# Patient Record
Sex: MALE | Race: WHITE | URBAN - METROPOLITAN AREA
[De-identification: names, ages, dates, MRNs, and addresses within clinical notes are randomized per-mention and may not be internally consistent; named-entity substitution may affect disease eponyms.]

---

## 2022-04-22 ENCOUNTER — OFFICE VISIT (OUTPATIENT)
Dept: URBAN - METROPOLITAN AREA CLINIC 52 | Facility: CLINIC | Age: 42
End: 2022-04-22
Payer: COMMERCIAL

## 2022-04-22 DIAGNOSIS — H40.9 UNSPECIFIED GLAUCOMA: ICD-10-CM

## 2022-04-22 DIAGNOSIS — H20.011 PRIMARY IRIDOCYCLITIS, RIGHT EYE: Primary | ICD-10-CM

## 2022-04-22 DIAGNOSIS — H44.441 PRIMARY HYPOTONY OF RIGHT EYE: ICD-10-CM

## 2022-04-22 DIAGNOSIS — H27.01 APHAKIA, RIGHT EYE: ICD-10-CM

## 2022-04-22 PROCEDURE — 99204 OFFICE O/P NEW MOD 45 MIN: CPT | Performed by: OPTOMETRIST

## 2022-04-22 RX ORDER — PREDNISOLONE ACETATE 10 MG/ML
1 % SUSPENSION/ DROPS OPHTHALMIC
Qty: 5 | Refills: 3 | Status: ACTIVE
Start: 2022-04-22

## 2022-04-22 ASSESSMENT — VISUAL ACUITY: OS: 20/20

## 2022-04-22 ASSESSMENT — INTRAOCULAR PRESSURE
OS: 12
OD: 4

## 2022-04-22 NOTE — IMPRESSION/PLAN
Impression: Unspecified glaucoma: H40.9. Plan: Patient unsure of name of drop, states he uses QHS dosing. Advised patient to stop taking gtt OD until he sees retina. Patient interested in transferring glaucoma care to [facility], discussed that I would be happy to take over his case, advised him to setup glaucoma eval if he decides to proceed with us.

## 2022-04-22 NOTE — IMPRESSION/PLAN
Impression: Primary iridocyclitis, right eye: H20.011. Plan: Patient presents with [facility] reaction, corneal edema, and hypotony of OD with ciliary flush. Will start Pred Acetate Q3hrs OD. IOP measures zero today with goldmann tonometry, no posterior view due to corneal haze and AC reaction, possible retinal/choroidal detachment. Patient states he now has much less vision than before. Will setup patient with retina for eval with B scan, possible vitrectomy to increase IOP, and continued management of iridocyclitis.

## 2022-04-25 ENCOUNTER — OFFICE VISIT (OUTPATIENT)
Dept: URBAN - METROPOLITAN AREA CLINIC 13 | Facility: CLINIC | Age: 42
End: 2022-04-25
Payer: COMMERCIAL

## 2022-04-25 DIAGNOSIS — H17.9 CORNEAL SCAR: ICD-10-CM

## 2022-04-25 DIAGNOSIS — H33.001 UNSPECIFIED RETINAL DETACHMENT WITH RETINAL BREAK, RIGHT EYE: Primary | ICD-10-CM

## 2022-04-25 DIAGNOSIS — H27.01 APHAKIA, RIGHT EYE: ICD-10-CM

## 2022-04-25 DIAGNOSIS — H25.12 AGE-RELATED NUCLEAR CATARACT, LEFT EYE: ICD-10-CM

## 2022-04-25 DIAGNOSIS — H40.9 GLAUCOMA: ICD-10-CM

## 2022-04-25 PROCEDURE — 92134 CPTRZ OPH DX IMG PST SGM RTA: CPT | Performed by: OPHTHALMOLOGY

## 2022-04-25 PROCEDURE — 92004 COMPRE OPH EXAM NEW PT 1/>: CPT | Performed by: OPHTHALMOLOGY

## 2022-04-25 PROCEDURE — 76512 OPH US DX B-SCAN: CPT | Performed by: OPHTHALMOLOGY

## 2022-04-25 RX ORDER — PREDNISOLONE ACETATE 10 MG/ML
1 % SUSPENSION/ DROPS OPHTHALMIC
Qty: 5 | Refills: 3 | Status: ACTIVE
Start: 2022-04-25

## 2022-04-25 RX ORDER — OFLOXACIN 3 MG/ML
0.3 % SOLUTION/ DROPS OPHTHALMIC
Qty: 5 | Refills: 3 | Status: INACTIVE
Start: 2022-04-25 | End: 2022-05-16

## 2022-04-25 ASSESSMENT — INTRAOCULAR PRESSURE
OS: 19
OD: 14

## 2022-04-25 NOTE — IMPRESSION/PLAN
Impression: Unspecified retinal detachment with retinal break, right eye: H33.001.
-symptoms x 1-2 weeks OCT:
OD: no view OS: wnl BScan OD: 
total RD, +PVD Plan: Examination reveals a retinal detachment. The diagnosis, natural history, and prognosis of rhegmatogenous retinal detachment, as well as the risks and benefits of intervention were discussed at length. . To reduce the risk of further visual loss, treatment is strongly recommended. The patient was informed of various surgical techniques; altitude precautions with a gas bubble, including the danger of loss of the eye with travel to a higher altitude or flying; and the possible need to update spectacle correction if a scleral buckle is used. The patient understands that the vision usually improves gradually over a period of several months to 1 year, but may not improve to prior levels. The patient elects to proceed with retinal detachment repair. Risk of redetachment, or proliferative vitreoretinopathy, scar formation, macular pucker, hyphema, glaucoma, hypotony, infection, loss of vision, loss of eye, blindness were fully explained to the patient who voices understanding and agreed to surgery. 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 Discussed limited visual prognosis. Discussed possibility of needing multiple surgeries also corneal transplant. Risk of PVR and failure of surgical repair discussed.  

Rec: 25 g PPV, EL, SO OD x RRD (within 24 hours)

## 2022-04-26 ENCOUNTER — Encounter (OUTPATIENT)
Dept: URBAN - METROPOLITAN AREA EXTERNAL CLINIC 14 | Facility: EXTERNAL CLINIC | Age: 42
End: 2022-04-26
Payer: COMMERCIAL

## 2022-04-26 PROCEDURE — 67108 REPAIR DETACHED RETINA: CPT | Performed by: OPHTHALMOLOGY

## 2022-04-27 ENCOUNTER — POST-OPERATIVE VISIT (OUTPATIENT)
Dept: URBAN - METROPOLITAN AREA CLINIC 54 | Facility: CLINIC | Age: 42
End: 2022-04-27
Payer: COMMERCIAL

## 2022-04-27 PROCEDURE — 99024 POSTOP FOLLOW-UP VISIT: CPT | Performed by: OPHTHALMOLOGY

## 2022-04-27 RX ORDER — BRIMONIDINE TARTRATE 1.5 MG/ML
0.15 % SOLUTION/ DROPS OPHTHALMIC
Qty: 5 | Refills: 3 | Status: INACTIVE
Start: 2022-04-27 | End: 2022-04-27

## 2022-04-27 ASSESSMENT — INTRAOCULAR PRESSURE
OS: 10
OD: 28

## 2022-04-27 NOTE — IMPRESSION/PLAN
Impression: S/P 25 g PPV, EL, SO OD x RRD OD - 1 Day. Unspecified retinal detachment with retinal break, right eye  H33.001. Plan: avoid supine 
alt. prec. FDP
add alphagan RTC 1 week POS with NVP --Continue Prednisolone acetate 1%--Continue Ofloxacin 0.3% QID OD

## 2022-05-03 ENCOUNTER — POST-OPERATIVE VISIT (OUTPATIENT)
Dept: URBAN - METROPOLITAN AREA CLINIC 13 | Facility: CLINIC | Age: 42
End: 2022-05-03

## 2022-05-03 PROCEDURE — 99024 POSTOP FOLLOW-UP VISIT: CPT | Performed by: OPHTHALMOLOGY

## 2022-05-03 RX ORDER — DORZOLAMIDE HYDROCHLORIDE AND TIMOLOL MALEATE 20; 5 MG/ML; MG/ML
SOLUTION/ DROPS OPHTHALMIC
Qty: 0 | Refills: 0 | Status: INACTIVE
Start: 2022-05-03 | End: 2022-05-03

## 2022-05-03 RX ORDER — ACETAZOLAMIDE 250 MG/1
250 MG TABLET ORAL
Qty: 32 | Refills: 0 | Status: INACTIVE
Start: 2022-05-03 | End: 2022-05-10

## 2022-05-03 ASSESSMENT — INTRAOCULAR PRESSURE
OS: 15
OD: 43
OD: 38
OD: 15

## 2022-05-03 NOTE — IMPRESSION/PLAN
Impression: S/P 25 g PPV, EL, SO OD x RRD OD - 7 Days. Unspecified retinal detachment with retinal break, right eye  H33.001. Plan: Pt with high IOP. Has patent PI. AC deep. Has some gas in cilliary sulcus. Aphakic. Will do AC tap today. IOP post tap 15mm Hg. Position on side. Start Diamox 250mg po QID Continue Oflox QID x 3 days, Pred QID, Brimonidine TID, Latanoprost qhs
RTC 1 week DFE OD

## 2022-05-10 ENCOUNTER — POST-OPERATIVE VISIT (OUTPATIENT)
Dept: URBAN - METROPOLITAN AREA CLINIC 13 | Facility: CLINIC | Age: 42
End: 2022-05-10
Payer: COMMERCIAL

## 2022-05-10 PROCEDURE — 99024 POSTOP FOLLOW-UP VISIT: CPT | Performed by: OPHTHALMOLOGY

## 2022-05-10 ASSESSMENT — INTRAOCULAR PRESSURE
OD: 17
OS: 12

## 2022-05-10 NOTE — IMPRESSION/PLAN
Impression: S/P 25 g PPV, EL, SO OD x RRD OD - 14 Days. Unspecified retinal detachment with retinal break, right eye  H33.001. Plan: IOP better. Pain better. Still with significant MCE. Will start Augustina drops. Continue PF BID, Brimonidine BID, Latanoprost qhs, Diamox 250mg BID until runs out. Sleep on side. Gas precautions RTC 2 weeks DFE OD OCT OD

## 2022-05-16 ENCOUNTER — POST-OPERATIVE VISIT (OUTPATIENT)
Dept: URBAN - METROPOLITAN AREA CLINIC 54 | Facility: CLINIC | Age: 42
End: 2022-05-16
Payer: COMMERCIAL

## 2022-05-16 PROCEDURE — 99024 POSTOP FOLLOW-UP VISIT: CPT | Performed by: OPHTHALMOLOGY

## 2022-05-16 ASSESSMENT — INTRAOCULAR PRESSURE
OS: 15
OD: 28

## 2022-05-16 NOTE — IMPRESSION/PLAN
Impression: S/P 25 g PPV, EL, SO OD x RRD OD - 20 Days. Unspecified retinal detachment with retinal break, right eye  H33.001. Plan: Patient returns reporting seeing a line across vision in right eye. Discussed with patient this likely represents the gas bubble which would be down to about 50% at this time in post-operative period and would bisect vision. Patient also states that vision was better prior to surgery. Reviewed effects of gas bubble on vision. IOP remains elevated today. Patient unsure if taking drops/meds appropriately. Written instructions provided to patient to ensure patient using medications as directed:

Prednisolone BID OD Brimonidine BID OD Latanoprost q hs OD Diamox 250 mg BID (#14 tablets provided to patient) Augustina drops QID OD Follow up with Dr Judith Jane as scheduled in 2 weeks

## 2022-05-24 ENCOUNTER — POST-OPERATIVE VISIT (OUTPATIENT)
Dept: URBAN - METROPOLITAN AREA CLINIC 13 | Facility: CLINIC | Age: 42
End: 2022-05-24

## 2022-05-24 PROCEDURE — 99024 POSTOP FOLLOW-UP VISIT: CPT | Performed by: OPHTHALMOLOGY

## 2022-05-24 PROCEDURE — 76512 OPH US DX B-SCAN: CPT | Performed by: OPHTHALMOLOGY

## 2022-05-24 ASSESSMENT — INTRAOCULAR PRESSURE
OS: 10
OD: 6

## 2022-05-24 NOTE — IMPRESSION/PLAN
Impression: S/P 25 g PPV, EL, SO OD x RRD OD - 28 Days. Unspecified retinal detachment with retinal break, right eye  H33.001. B-scan OD: shallow SRF below level of gas Plan: Continue current drops: PF BID, Brimonidine BID, Latanoprost qhs, Augustina QID. Will refer to [facility] for evaluation for combo sx. Possible inferior RD on B-scan. Will recheck in 2 weeks.  

Rec: 25g PPV, EL, Gas vs SO OD x RD (combo TKP, PKP)

## 2022-06-06 ENCOUNTER — POST-OPERATIVE VISIT (OUTPATIENT)
Dept: URBAN - METROPOLITAN AREA CLINIC 13 | Facility: CLINIC | Age: 42
End: 2022-06-06
Payer: COMMERCIAL

## 2022-06-06 DIAGNOSIS — H33.001 UNSPECIFIED RETINAL DETACHMENT WITH RETINAL BREAK, RIGHT EYE: Primary | ICD-10-CM

## 2022-06-06 PROCEDURE — 99024 POSTOP FOLLOW-UP VISIT: CPT | Performed by: OPHTHALMOLOGY

## 2022-06-06 ASSESSMENT — INTRAOCULAR PRESSURE
OS: 12
OD: 20

## 2022-06-06 NOTE — IMPRESSION/PLAN
Impression: S/P 25 g PPV, EL, SO OD x RRD OD - 41 Days. Unspecified retinal detachment with retinal break, right eye  H33.001. B-scan OD: inferior RD Plan: Unfortunately pt has recurrent RD. Will schedule combo sx. Rec: 25g PPV, TKP, PKP, EL, SO OD x RD.  Combo with Dr Ernestina Mead

## 2022-06-22 ENCOUNTER — POST-OPERATIVE VISIT (OUTPATIENT)
Dept: URBAN - METROPOLITAN AREA CLINIC 13 | Facility: CLINIC | Age: 42
End: 2022-06-22
Payer: COMMERCIAL

## 2022-06-22 DIAGNOSIS — H33.001 UNSPECIFIED RETINAL DETACHMENT WITH RETINAL BREAK, RIGHT EYE: Primary | ICD-10-CM

## 2022-06-22 PROCEDURE — 99024 POSTOP FOLLOW-UP VISIT: CPT | Performed by: OPHTHALMOLOGY

## 2022-06-22 ASSESSMENT — INTRAOCULAR PRESSURE
OS: 12
OD: 22

## 2022-06-22 NOTE — IMPRESSION/PLAN
Impression: S/P 25G PPV/TKP/PKP/EL/SO OD - 1 Day. Unspecified retinal detachment with retinal break, right eye  H33.001.  Plan: --Excellent post op course   
--Post operative instructions reviewed 
--Condition is improving 
--No signs or symptoms of infection
--IOP normal

RTC: 1 week POS with NVP

## 2022-06-23 ENCOUNTER — POST-OPERATIVE VISIT (OUTPATIENT)
Dept: URBAN - METROPOLITAN AREA CLINIC 54 | Facility: CLINIC | Age: 42
End: 2022-06-23
Payer: COMMERCIAL

## 2022-06-23 PROCEDURE — 99024 POSTOP FOLLOW-UP VISIT: CPT | Performed by: OPHTHALMOLOGY

## 2022-06-23 ASSESSMENT — INTRAOCULAR PRESSURE
OD: 22
OS: 13

## 2022-06-23 NOTE — IMPRESSION/PLAN
Impression: S/P 25G PPV/TKP/PKP/EL/SO OD - 2 Days. Unspecified retinal detachment with retinal break, right eye  H33.001. Plan: Continue Oflox QID; Brand name Pred Forte 6x/day; Brimonidine BID. Doing well. Reassurance given to patient. Sleep on side Pt with medial gaze restriction. ? secondary to block. Pt states it has been this way since his injury as child. RIght eye has always turned out.  

RTC as scheduled

## 2022-06-28 ENCOUNTER — POST-OPERATIVE VISIT (OUTPATIENT)
Dept: URBAN - METROPOLITAN AREA CLINIC 13 | Facility: CLINIC | Age: 42
End: 2022-06-28
Payer: COMMERCIAL

## 2022-06-28 DIAGNOSIS — H33.001 UNSPECIFIED RETINAL DETACHMENT WITH RETINAL BREAK, RIGHT EYE: Primary | ICD-10-CM

## 2022-06-28 PROCEDURE — 99024 POSTOP FOLLOW-UP VISIT: CPT | Performed by: OPHTHALMOLOGY

## 2022-06-28 ASSESSMENT — INTRAOCULAR PRESSURE
OD: 31
OS: 14

## 2022-06-28 NOTE — IMPRESSION/PLAN
Impression: S/P 25G PPV/TKP/PKP/EL/SO OD - 7 Days. Unspecified retinal detachment with retinal break, right eye  H33.001. Plan: PF per Dr Rubia Oakley. Stop Oflox. Continue Brimonidine and Latanoprost. IOP elevated today (possible steroid response) but difficult measurement. Pt is seeing Dr Rubia Oakley tomorrow for evaluation. Will have her office re-check IOP if still high consider additional med. Medial gaze restriction- stable. Per pt has been this way for longtime. Will refer to strabismus after healed.  

RTC 1 month POS DFE/OCT OD

## 2022-07-26 ENCOUNTER — POST-OPERATIVE VISIT (OUTPATIENT)
Dept: URBAN - METROPOLITAN AREA CLINIC 13 | Facility: CLINIC | Age: 42
End: 2022-07-26

## 2022-07-26 DIAGNOSIS — H33.001 UNSPECIFIED RETINAL DETACHMENT WITH RETINAL BREAK, RIGHT EYE: Primary | ICD-10-CM

## 2022-07-26 PROCEDURE — 99024 POSTOP FOLLOW-UP VISIT: CPT | Performed by: OPHTHALMOLOGY

## 2022-07-26 RX ORDER — ACETAZOLAMIDE 250 MG/1
250 MG TABLET ORAL
Qty: 60 | Refills: 1 | Status: INACTIVE
Start: 2022-07-26 | End: 2022-08-24

## 2022-07-26 ASSESSMENT — INTRAOCULAR PRESSURE
OD: 27
OS: 15

## 2022-07-26 NOTE — IMPRESSION/PLAN
Impression: S/P 25G PPV/TKP/PKP/EL/SO OD - 35 Days. Unspecified retinal detachment with retinal break, right eye  H33.001. OCT: 
OD: central atrophy; no ERM
OS:  wnl Plan: Continue PF TID. Continue Brimonidine BID, Latanoprost qhs. Pt states he is on 3rd IOP drop but not sure of name. Sleep on side. Will add Diamox 250mg BID. Given persistently high IOP will schedule early SO removal if ok with [facility]. 

Rec: 25g PPV Removal SO OD

## 2022-08-03 ENCOUNTER — POST-OPERATIVE VISIT (OUTPATIENT)
Dept: URBAN - METROPOLITAN AREA CLINIC 13 | Facility: CLINIC | Age: 42
End: 2022-08-03

## 2022-08-03 DIAGNOSIS — H33.001 UNSPECIFIED RETINAL DETACHMENT WITH RETINAL BREAK, RIGHT EYE: Primary | ICD-10-CM

## 2022-08-03 PROCEDURE — 99024 POSTOP FOLLOW-UP VISIT: CPT | Performed by: OPHTHALMOLOGY

## 2022-08-03 ASSESSMENT — INTRAOCULAR PRESSURE
OD: 7
OS: 12

## 2022-08-03 NOTE — IMPRESSION/PLAN
Impression: S/P 25g PPV Removal SO OD OD - 1 Day. Unspecified retinal detachment with retinal break, right eye  H33.001. Plan: Retina remains attached. IOP satisfactory OU. No infection. Patient is doing well. Starting Prednisolone and Ofloxacin QID OD. Continue Brimonidine BID OD, Latanoprost q hs OD Return in 1 week pos

## 2022-08-09 ENCOUNTER — POST-OPERATIVE VISIT (OUTPATIENT)
Dept: URBAN - METROPOLITAN AREA CLINIC 13 | Facility: CLINIC | Age: 42
End: 2022-08-09
Payer: COMMERCIAL

## 2022-08-09 DIAGNOSIS — H33.001 UNSPECIFIED RETINAL DETACHMENT WITH RETINAL BREAK, RIGHT EYE: Primary | ICD-10-CM

## 2022-08-09 PROCEDURE — 92134 CPTRZ OPH DX IMG PST SGM RTA: CPT | Performed by: OPHTHALMOLOGY

## 2022-08-09 PROCEDURE — 76512 OPH US DX B-SCAN: CPT | Performed by: OPHTHALMOLOGY

## 2022-08-09 PROCEDURE — 99024 POSTOP FOLLOW-UP VISIT: CPT | Performed by: OPHTHALMOLOGY

## 2022-08-09 ASSESSMENT — INTRAOCULAR PRESSURE
OD: 21
OS: 13

## 2022-08-09 NOTE — IMPRESSION/PLAN
Impression: S/P 25g PPV Removal SO OD OD - 7 Days. Unspecified retinal detachment with retinal break, right eye  H33.001. B-scan: retina attached Plan: Doing well. Retina attached. IOP better. PF QID Brimonidine BID Latanoprost qhs

RTC 1 month DFE OD OCT OD

## 2022-09-08 ENCOUNTER — POST-OPERATIVE VISIT (OUTPATIENT)
Dept: URBAN - METROPOLITAN AREA CLINIC 54 | Facility: CLINIC | Age: 42
End: 2022-09-08
Payer: COMMERCIAL

## 2022-09-08 DIAGNOSIS — H33.001 UNSPECIFIED RETINAL DETACHMENT WITH RETINAL BREAK, RIGHT EYE: Primary | ICD-10-CM

## 2022-09-08 PROCEDURE — 99024 POSTOP FOLLOW-UP VISIT: CPT | Performed by: OPHTHALMOLOGY

## 2022-09-08 ASSESSMENT — INTRAOCULAR PRESSURE
OD: 9
OS: 13

## 2022-09-08 NOTE — IMPRESSION/PLAN
Impression: S/P 25g PPV Removal SO OD OD - 37 Days. Unspecified retinal detachment with retinal break, right eye  H33.001. OCT:
OD: mac flat; central atrophy Plan: Doing well. VH cleared. Retina attached. IOP better.  

Continue PF QID (will let Dr Rubia Oakley taper when ready), Brimonidine BID, Latanoprost qhs

RTC 3 months DFE OU OCT OU

## 2022-11-15 ENCOUNTER — OFFICE VISIT (OUTPATIENT)
Dept: URBAN - METROPOLITAN AREA CLINIC 45 | Facility: CLINIC | Age: 42
End: 2022-11-15
Payer: COMMERCIAL

## 2022-11-15 DIAGNOSIS — H40.1113 PRIMARY OPEN-ANGLE GLAUCOMA, RIGHT EYE, SEVERE STAGE: Primary | ICD-10-CM

## 2022-11-15 DIAGNOSIS — H40.1121 PRIMARY OPEN-ANGLE GLAUCOMA, LEFT EYE, MILD STAGE: ICD-10-CM

## 2022-11-15 PROCEDURE — 92083 EXTENDED VISUAL FIELD XM: CPT | Performed by: OPHTHALMOLOGY

## 2022-11-15 PROCEDURE — 99204 OFFICE O/P NEW MOD 45 MIN: CPT | Performed by: OPHTHALMOLOGY

## 2022-11-15 PROCEDURE — 92133 CPTRZD OPH DX IMG PST SGM ON: CPT | Performed by: OPHTHALMOLOGY

## 2022-11-15 PROCEDURE — 76514 ECHO EXAM OF EYE THICKNESS: CPT | Performed by: OPHTHALMOLOGY

## 2022-11-15 ASSESSMENT — INTRAOCULAR PRESSURE
OD: 9
OS: 10

## 2022-11-16 NOTE — IMPRESSION/PLAN
Impression: Primary open-angle glaucoma, right eye, severe stage: H40.1113. /586, 11/22 OCT 64/77 4/10, 11/22 HVF OD-unable OS ID, GCC -/68

s/p PKP OD Plan: Discussed diagnosis with patient. HVF and RNFL OCT ordered and reviewed with patient. Discussed treatment options. Recommend patient continue Latanoprost QHS OU, Brimonidine BID OD and Prednisolone BID OD. Will continue to monitor.  4 month IOP check and Optos

## 2022-12-08 ENCOUNTER — OFFICE VISIT (OUTPATIENT)
Dept: URBAN - METROPOLITAN AREA CLINIC 54 | Facility: CLINIC | Age: 42
End: 2022-12-08
Payer: COMMERCIAL

## 2022-12-08 DIAGNOSIS — H17.9 CORNEAL SCAR: ICD-10-CM

## 2022-12-08 DIAGNOSIS — H33.001 UNSPECIFIED RETINAL DETACHMENT WITH RETINAL BREAK, RIGHT EYE: ICD-10-CM

## 2022-12-08 DIAGNOSIS — H40.1121 PRIMARY OPEN-ANGLE GLAUCOMA, LEFT EYE, MILD STAGE: Primary | ICD-10-CM

## 2022-12-08 DIAGNOSIS — H59.811 CHORIORETINAL SCAR AFTER SURGERY FOR DETACHMENT OF RIGHT EYE: ICD-10-CM

## 2022-12-08 DIAGNOSIS — H40.9 GLAUCOMA: ICD-10-CM

## 2022-12-08 PROCEDURE — 92134 CPTRZ OPH DX IMG PST SGM RTA: CPT | Performed by: OPHTHALMOLOGY

## 2022-12-08 PROCEDURE — 99213 OFFICE O/P EST LOW 20 MIN: CPT | Performed by: OPHTHALMOLOGY

## 2022-12-08 ASSESSMENT — INTRAOCULAR PRESSURE
OS: 16
OD: 21

## 2022-12-08 NOTE — IMPRESSION/PLAN
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 Right.
-s/p RD repair OCT: 
OD: flat OS: wnl Plan: Doing well. VH cleared. Retina attached. IOP better.  

Continue PF q day (will let Dr Sarah Gomez taper when ready), Brimonidine BID, Latanoprost qhs

RTC 6 months DFE OU OCT OU

## 2023-04-25 ENCOUNTER — OFFICE VISIT (OUTPATIENT)
Dept: URBAN - METROPOLITAN AREA CLINIC 13 | Facility: CLINIC | Age: 43
End: 2023-04-25
Payer: COMMERCIAL

## 2023-04-25 DIAGNOSIS — H59.811 CHORIORETINAL SCAR AFTER SURGERY FOR DETACHMENT OF RIGHT EYE: Primary | ICD-10-CM

## 2023-04-25 DIAGNOSIS — H17.9 UNSPECIFIED CORNEAL SCAR AND OPACITY: ICD-10-CM

## 2023-04-25 DIAGNOSIS — H40.1121 PRIMARY OPEN-ANGLE GLAUCOMA, LEFT EYE, MILD STAGE: ICD-10-CM

## 2023-04-25 DIAGNOSIS — H40.1113 PRIMARY OPEN-ANGLE GLAUCOMA, RIGHT EYE, SEVERE STAGE: ICD-10-CM

## 2023-04-25 PROCEDURE — 92134 CPTRZ OPH DX IMG PST SGM RTA: CPT | Performed by: OPHTHALMOLOGY

## 2023-04-25 PROCEDURE — 99213 OFFICE O/P EST LOW 20 MIN: CPT | Performed by: OPHTHALMOLOGY

## 2023-04-25 ASSESSMENT — INTRAOCULAR PRESSURE
OS: 13
OD: 20

## 2023-04-25 NOTE — IMPRESSION/PLAN
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 Right.
-s/p RD repair OCT: 
OD: flat OS: wnl Plan: Doing well. VH cleared. Retina attached. IOP better.  

RTC 6 months DFE OU OCT OU

## 2023-04-25 NOTE — IMPRESSION/PLAN
Impression: Primary open-angle glaucoma, right eye, severe stage: H40.1113.  /586, 11/22 OCT 64/77 4/10, 11/22 HVF OD-unable OS ID, GCC -/68

s/p PKP OD Plan: Continue follow up with Dr Jessica Simpson

## 2023-05-10 ENCOUNTER — OFFICE VISIT (OUTPATIENT)
Dept: URBAN - METROPOLITAN AREA CLINIC 45 | Facility: CLINIC | Age: 43
End: 2023-05-10
Payer: COMMERCIAL

## 2023-05-10 DIAGNOSIS — H17.9 CORNEAL SCAR: ICD-10-CM

## 2023-05-10 DIAGNOSIS — H40.1121 PRIMARY OPEN-ANGLE GLAUCOMA, LEFT EYE, MILD STAGE: ICD-10-CM

## 2023-05-10 PROCEDURE — 99213 OFFICE O/P EST LOW 20 MIN: CPT | Performed by: OPHTHALMOLOGY

## 2023-05-10 ASSESSMENT — INTRAOCULAR PRESSURE
OD: 9
OS: 12

## 2023-05-10 NOTE — IMPRESSION/PLAN
Impression: Primary open-angle glaucoma, right eye, severe stage: H40.1113. /586, 11/22 OCT 64/77 4/10, 11/22 HVF OD-unable OS ID, GCC -/68

s/p PKP OD Plan: IOP is low. Continue Timolol BID OD, Latanoprost QHS OU, Brimonidine BID OD and Prednisolone QDAY OD.  2 month IOP check.

## 2023-05-11 ENCOUNTER — OFFICE VISIT (OUTPATIENT)
Dept: URBAN - METROPOLITAN AREA CLINIC 54 | Facility: CLINIC | Age: 43
End: 2023-05-11
Payer: COMMERCIAL

## 2023-05-11 DIAGNOSIS — H40.1113 PRIMARY OPEN-ANGLE GLAUCOMA, RIGHT EYE, SEVERE STAGE: ICD-10-CM

## 2023-05-11 DIAGNOSIS — H27.01 APHAKIA, RIGHT EYE: ICD-10-CM

## 2023-05-11 DIAGNOSIS — H59.811 CHORIORETINAL SCAR AFTER SURGERY FOR DETACHMENT OF RIGHT EYE: Primary | ICD-10-CM

## 2023-05-11 PROCEDURE — 99213 OFFICE O/P EST LOW 20 MIN: CPT | Performed by: OPHTHALMOLOGY

## 2023-05-11 PROCEDURE — 92134 CPTRZ OPH DX IMG PST SGM RTA: CPT | Performed by: OPHTHALMOLOGY

## 2023-05-11 ASSESSMENT — INTRAOCULAR PRESSURE
OD: 10
OS: 12

## 2023-05-11 NOTE — IMPRESSION/PLAN
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 Right.
-s/p RD repair OCT: 05/11/23 OD: flat OS: wnl Plan: Doing well. VH cleared. Retina attached. IOP better.  

RTC 6 months DFE OU OCT OU

## 2023-05-11 NOTE — IMPRESSION/PLAN
Impression: Aphakia, right eye: H27.01 Right. Plan: Long discussion with patient about secondary IOL vs aphakic correction. He prefers to try contact lens first before deciding on IOL. RTC as scheduled.  Will refer for contact lens exam.

## 2023-05-11 NOTE — IMPRESSION/PLAN
Impression: Primary open-angle glaucoma, right eye, severe stage: H40.1113.  /586, 11/22 OCT 64/77 4/10, 11/22 HVF OD-unable OS ID, GCC -/68

s/p PKP OD Plan: Continue follow up with Dr Angela Quintana

## 2023-05-11 NOTE — IMPRESSION/PLAN
Apply Erythromycin ointment to prevent infection and take Percocet as needed for pain.   Follow up with Ophthalmology in 1-2 days. You have a large corneal abrasion which can turn into a bad infection and lead to blindness if you do not follow up.   Return to ER for worsening symptoms or as needed.    Impression: Primary open-angle glaucoma, left eye, mild stage: H40.1121. Plan: Review H40.1113.

## 2023-07-19 ENCOUNTER — OFFICE VISIT (OUTPATIENT)
Dept: URBAN - METROPOLITAN AREA CLINIC 45 | Facility: CLINIC | Age: 43
End: 2023-07-19
Payer: COMMERCIAL

## 2023-07-19 DIAGNOSIS — H40.1121 PRIMARY OPEN-ANGLE GLAUCOMA, LEFT EYE, MILD STAGE: ICD-10-CM

## 2023-07-19 DIAGNOSIS — H17.9 CORNEAL SCAR: ICD-10-CM

## 2023-07-19 DIAGNOSIS — H40.1113 PRIMARY OPEN-ANGLE GLAUCOMA, RIGHT EYE, SEVERE STAGE: Primary | ICD-10-CM

## 2023-07-19 PROCEDURE — 92012 INTRM OPH EXAM EST PATIENT: CPT | Performed by: OPHTHALMOLOGY

## 2023-07-19 ASSESSMENT — INTRAOCULAR PRESSURE
OS: 10
OD: 10

## 2023-07-19 NOTE — IMPRESSION/PLAN
Impression: Primary open-angle glaucoma, right eye, severe stage: H40.1113. /586, 11/22 OCT 64/77 4/10, 11/22 HVF OD-unable OS ID, GCC -/68

s/p PKP OD Plan: IOP remains stable. Continue Timolol BID OD, Latanoprost QHS OU, Brimonidine BID OD and Prednisolone QDAY OD. 4 month IOP check.

## 2023-11-30 ENCOUNTER — OFFICE VISIT (OUTPATIENT)
Dept: URBAN - METROPOLITAN AREA CLINIC 54 | Facility: CLINIC | Age: 43
End: 2023-11-30
Payer: COMMERCIAL

## 2023-11-30 DIAGNOSIS — H27.01 APHAKIA, RIGHT EYE: ICD-10-CM

## 2023-11-30 DIAGNOSIS — H40.1113 PRIMARY OPEN-ANGLE GLAUCOMA, RIGHT EYE, SEVERE STAGE: ICD-10-CM

## 2023-11-30 DIAGNOSIS — Z94.7 CORNEAL TRANSPLANT STATUS: ICD-10-CM

## 2023-11-30 PROCEDURE — 99213 OFFICE O/P EST LOW 20 MIN: CPT | Performed by: OPHTHALMOLOGY

## 2023-11-30 PROCEDURE — 92134 CPTRZ OPH DX IMG PST SGM RTA: CPT | Performed by: OPHTHALMOLOGY

## 2023-11-30 RX ORDER — BRIMONIDINE TARTRATE 2 MG/ML
0.2 % SOLUTION/ DROPS OPHTHALMIC
Qty: 5 | Refills: 2 | Status: INACTIVE
Start: 2023-11-30 | End: 2024-01-09

## 2023-11-30 ASSESSMENT — INTRAOCULAR PRESSURE
OS: 8
OD: 11

## 2024-01-09 ENCOUNTER — OFFICE VISIT (OUTPATIENT)
Dept: URBAN - METROPOLITAN AREA CLINIC 45 | Facility: CLINIC | Age: 44
End: 2024-01-09
Payer: COMMERCIAL

## 2024-01-09 DIAGNOSIS — H59.811 CHORIORETINAL SCAR AFTER SURGERY FOR DETACHMENT OF RIGHT EYE: ICD-10-CM

## 2024-01-09 PROCEDURE — 99213 OFFICE O/P EST LOW 20 MIN: CPT | Performed by: OPHTHALMOLOGY

## 2024-01-09 RX ORDER — BRIMONIDINE TARTRATE 2 MG/ML
0.2 % SOLUTION/ DROPS OPHTHALMIC
Qty: 5 | Refills: 2 | Status: ACTIVE
Start: 2024-01-09

## 2024-01-09 RX ORDER — TIMOLOL MALEATE 5 MG/ML
0.5 % SOLUTION/ DROPS OPHTHALMIC
Qty: 15 | Refills: 2 | Status: ACTIVE
Start: 2024-01-09

## 2024-01-09 RX ORDER — LATANOPROST 50 UG/ML
0.005 % SOLUTION OPHTHALMIC
Qty: 15 | Refills: 3 | Status: ACTIVE
Start: 2024-01-09

## 2024-01-09 ASSESSMENT — INTRAOCULAR PRESSURE
OD: 11
OS: 11

## 2024-05-07 ENCOUNTER — OFFICE VISIT (OUTPATIENT)
Dept: URBAN - METROPOLITAN AREA CLINIC 45 | Facility: CLINIC | Age: 44
End: 2024-05-07
Payer: COMMERCIAL

## 2024-05-07 DIAGNOSIS — H40.1113 PRIMARY OPEN-ANGLE GLAUCOMA, RIGHT EYE, SEVERE STAGE: Primary | ICD-10-CM

## 2024-05-07 DIAGNOSIS — Z94.7 CORNEAL TRANSPLANT STATUS: ICD-10-CM

## 2024-05-07 DIAGNOSIS — H59.811 CHORIORETINAL SCAR AFTER SURGERY FOR DETACHMENT OF RIGHT EYE: ICD-10-CM

## 2024-05-07 PROCEDURE — 99214 OFFICE O/P EST MOD 30 MIN: CPT | Performed by: OPHTHALMOLOGY

## 2024-05-07 PROCEDURE — 92083 EXTENDED VISUAL FIELD XM: CPT | Performed by: OPHTHALMOLOGY

## 2024-05-07 RX ORDER — BRIMONIDINE TARTRATE 2 MG/ML
0.2 % SOLUTION/ DROPS OPHTHALMIC
Qty: 5 | Refills: 2 | Status: ACTIVE
Start: 2024-05-07

## 2024-05-07 ASSESSMENT — INTRAOCULAR PRESSURE
OS: 10
OD: 11

## 2024-05-30 ENCOUNTER — OFFICE VISIT (OUTPATIENT)
Dept: URBAN - METROPOLITAN AREA CLINIC 54 | Facility: CLINIC | Age: 44
End: 2024-05-30
Payer: COMMERCIAL

## 2024-05-30 DIAGNOSIS — Z94.7 CORNEAL TRANSPLANT STATUS: ICD-10-CM

## 2024-05-30 DIAGNOSIS — H40.1113 PRIMARY OPEN-ANGLE GLAUCOMA, RIGHT EYE, SEVERE STAGE: ICD-10-CM

## 2024-05-30 DIAGNOSIS — H59.811 CHORIORETINAL SCAR AFTER SURGERY FOR DETACHMENT OF RIGHT EYE: Primary | ICD-10-CM

## 2024-05-30 PROCEDURE — 99214 OFFICE O/P EST MOD 30 MIN: CPT | Performed by: OPHTHALMOLOGY

## 2024-05-30 PROCEDURE — 92134 CPTRZ OPH DX IMG PST SGM RTA: CPT | Performed by: OPHTHALMOLOGY

## 2024-05-30 ASSESSMENT — INTRAOCULAR PRESSURE
OD: 31
OS: 17

## 2024-11-07 ENCOUNTER — OFFICE VISIT (OUTPATIENT)
Dept: URBAN - METROPOLITAN AREA CLINIC 45 | Facility: CLINIC | Age: 44
End: 2024-11-07
Payer: COMMERCIAL

## 2024-11-07 DIAGNOSIS — H40.1113 PRIMARY OPEN-ANGLE GLAUCOMA, RIGHT EYE, SEVERE STAGE: ICD-10-CM

## 2024-11-07 DIAGNOSIS — H59.811 CHORIORETINAL SCAR AFTER SURGERY FOR DETACHMENT OF RIGHT EYE: ICD-10-CM

## 2024-11-07 DIAGNOSIS — H40.1121 PRIMARY OPEN-ANGLE GLAUCOMA, LEFT EYE, MILD STAGE: ICD-10-CM

## 2024-11-07 DIAGNOSIS — H27.01 APHAKIA, RIGHT EYE: Primary | ICD-10-CM

## 2024-11-07 DIAGNOSIS — Z94.7 CORNEAL TRANSPLANT STATUS: ICD-10-CM

## 2024-11-07 PROCEDURE — 99213 OFFICE O/P EST LOW 20 MIN: CPT | Performed by: OPTOMETRIST

## 2024-11-07 RX ORDER — LATANOPROST 50 UG/ML
0.005 % SOLUTION OPHTHALMIC
Qty: 15 | Refills: 3 | Status: ACTIVE
Start: 2024-11-07

## 2024-11-07 RX ORDER — TIMOLOL MALEATE 5 MG/ML
0.5 % SOLUTION/ DROPS OPHTHALMIC
Qty: 45 | Refills: 0 | Status: ACTIVE
Start: 2024-11-07

## 2024-11-07 RX ORDER — BRIMONIDINE TARTRATE 2 MG/ML
0.2 % SOLUTION/ DROPS OPHTHALMIC
Qty: 5 | Refills: 2 | Status: ACTIVE
Start: 2024-11-07

## 2024-11-07 ASSESSMENT — INTRAOCULAR PRESSURE
OD: 22
OS: 11

## 2024-11-11 ENCOUNTER — OFFICE VISIT (OUTPATIENT)
Dept: URBAN - METROPOLITAN AREA CLINIC 45 | Facility: CLINIC | Age: 44
End: 2024-11-11
Payer: COMMERCIAL

## 2024-11-11 DIAGNOSIS — Z94.7 CORNEAL TRANSPLANT STATUS: ICD-10-CM

## 2024-11-11 DIAGNOSIS — H40.1121 PRIMARY OPEN-ANGLE GLAUCOMA, LEFT EYE, MILD STAGE: ICD-10-CM

## 2024-11-11 DIAGNOSIS — H40.1113 PRIMARY OPEN-ANGLE GLAUCOMA, RIGHT EYE, SEVERE STAGE: Primary | ICD-10-CM

## 2024-11-11 PROCEDURE — 99214 OFFICE O/P EST MOD 30 MIN: CPT | Performed by: OPTOMETRIST

## 2024-11-11 RX ORDER — DORZOLAMIDE HCL 20 MG/ML
2 % SOLUTION/ DROPS OPHTHALMIC
Qty: 1 | Refills: 0 | Status: ACTIVE
Start: 2024-11-11

## 2024-11-11 RX ORDER — BRIMONIDINE TARTRATE 2 MG/ML
0.2 % SOLUTION/ DROPS OPHTHALMIC
Qty: 5 | Refills: 2 | Status: ACTIVE
Start: 2024-11-11

## 2024-11-11 ASSESSMENT — INTRAOCULAR PRESSURE
OD: 32
OS: 17

## 2024-11-21 ENCOUNTER — OFFICE VISIT (OUTPATIENT)
Dept: URBAN - METROPOLITAN AREA CLINIC 45 | Facility: CLINIC | Age: 44
End: 2024-11-21
Payer: COMMERCIAL

## 2024-11-21 DIAGNOSIS — H27.01 APHAKIA, RIGHT EYE: ICD-10-CM

## 2024-11-21 DIAGNOSIS — H40.1121 PRIMARY OPEN-ANGLE GLAUCOMA, LEFT EYE, MILD STAGE: ICD-10-CM

## 2024-11-21 DIAGNOSIS — H40.1113 PRIMARY OPEN-ANGLE GLAUCOMA, RIGHT EYE, SEVERE STAGE: Primary | ICD-10-CM

## 2024-11-21 DIAGNOSIS — Z94.7 CORNEAL TRANSPLANT STATUS: ICD-10-CM

## 2024-11-21 PROCEDURE — 99213 OFFICE O/P EST LOW 20 MIN: CPT | Performed by: OPTOMETRIST

## 2024-11-21 RX ORDER — BRIMONIDINE TARTRATE 2 MG/ML
0.2 % SOLUTION/ DROPS OPHTHALMIC
Qty: 5 | Refills: 2 | Status: ACTIVE
Start: 2024-11-21

## 2024-11-21 ASSESSMENT — INTRAOCULAR PRESSURE
OS: 17
OD: 18

## 2024-12-05 ENCOUNTER — OFFICE VISIT (OUTPATIENT)
Dept: URBAN - METROPOLITAN AREA CLINIC 54 | Facility: CLINIC | Age: 44
End: 2024-12-05
Payer: COMMERCIAL

## 2024-12-05 DIAGNOSIS — H40.1113 PRIMARY OPEN-ANGLE GLAUCOMA, RIGHT EYE, SEVERE STAGE: ICD-10-CM

## 2024-12-05 DIAGNOSIS — H59.811 CHORIORETINAL SCAR AFTER SURGERY FOR DETACHMENT OF RIGHT EYE: ICD-10-CM

## 2024-12-05 DIAGNOSIS — H27.01 APHAKIA, RIGHT EYE: Primary | ICD-10-CM

## 2024-12-05 DIAGNOSIS — Z94.7 CORNEAL TRANSPLANT STATUS: ICD-10-CM

## 2024-12-05 PROCEDURE — 99213 OFFICE O/P EST LOW 20 MIN: CPT | Performed by: OPHTHALMOLOGY

## 2024-12-05 PROCEDURE — 76512 OPH US DX B-SCAN: CPT | Performed by: OPHTHALMOLOGY

## 2024-12-05 ASSESSMENT — INTRAOCULAR PRESSURE
OS: 19
OD: 24

## 2025-02-06 ENCOUNTER — OFFICE VISIT (OUTPATIENT)
Dept: URBAN - METROPOLITAN AREA CLINIC 54 | Facility: CLINIC | Age: 45
End: 2025-02-06
Payer: COMMERCIAL

## 2025-02-06 DIAGNOSIS — Z94.7 CORNEAL TRANSPLANT STATUS: ICD-10-CM

## 2025-02-06 DIAGNOSIS — H59.811 CHORIORETINAL SCAR AFTER SURGERY FOR DETACHMENT OF RIGHT EYE: Primary | ICD-10-CM

## 2025-02-06 DIAGNOSIS — H40.1113 PRIMARY OPEN-ANGLE GLAUCOMA, RIGHT EYE, SEVERE STAGE: ICD-10-CM

## 2025-02-06 PROCEDURE — 76512 OPH US DX B-SCAN: CPT | Performed by: OPHTHALMOLOGY

## 2025-02-06 PROCEDURE — 99214 OFFICE O/P EST MOD 30 MIN: CPT | Performed by: OPHTHALMOLOGY

## 2025-02-06 ASSESSMENT — INTRAOCULAR PRESSURE
OD: 7
OS: 11

## 2025-02-13 ENCOUNTER — OFFICE VISIT (OUTPATIENT)
Dept: URBAN - METROPOLITAN AREA CLINIC 54 | Facility: CLINIC | Age: 45
End: 2025-02-13
Payer: COMMERCIAL

## 2025-02-13 DIAGNOSIS — H40.1113 PRIMARY OPEN-ANGLE GLAUCOMA, RIGHT EYE, SEVERE STAGE: ICD-10-CM

## 2025-02-13 DIAGNOSIS — H59.811 CHORIORETINAL SCAR AFTER SURGERY FOR DETACHMENT OF RIGHT EYE: Primary | ICD-10-CM

## 2025-02-13 DIAGNOSIS — Z94.7 CORNEAL TRANSPLANT STATUS: ICD-10-CM

## 2025-02-13 PROCEDURE — 99214 OFFICE O/P EST MOD 30 MIN: CPT | Performed by: OPHTHALMOLOGY

## 2025-02-13 PROCEDURE — 76512 OPH US DX B-SCAN: CPT | Performed by: OPHTHALMOLOGY

## 2025-02-13 RX ORDER — PREDNISONE 20 MG/1
20 MG TABLET ORAL
Qty: 90 | Refills: 0 | Status: INACTIVE
Start: 2025-02-13 | End: 2025-03-14

## 2025-02-13 ASSESSMENT — INTRAOCULAR PRESSURE
OS: 13
OD: 28

## 2025-03-21 ENCOUNTER — OFFICE VISIT (OUTPATIENT)
Dept: URBAN - METROPOLITAN AREA CLINIC 45 | Facility: CLINIC | Age: 45
End: 2025-03-21
Payer: COMMERCIAL

## 2025-03-21 DIAGNOSIS — H40.1113 PRIMARY OPEN-ANGLE GLAUCOMA, RIGHT EYE, SEVERE STAGE: Primary | ICD-10-CM

## 2025-03-21 DIAGNOSIS — Z94.7 CORNEAL TRANSPLANT STATUS: ICD-10-CM

## 2025-03-21 PROCEDURE — 99213 OFFICE O/P EST LOW 20 MIN: CPT | Performed by: OPTOMETRIST

## 2025-03-21 PROCEDURE — 92133 CPTRZD OPH DX IMG PST SGM ON: CPT | Performed by: OPTOMETRIST

## 2025-03-21 PROCEDURE — 92083 EXTENDED VISUAL FIELD XM: CPT | Performed by: OPTOMETRIST

## 2025-03-21 RX ORDER — BRIMONIDINE TARTRATE 2 MG/ML
0.2 % SOLUTION/ DROPS OPHTHALMIC
Qty: 5 | Refills: 2 | Status: INACTIVE
Start: 2025-03-21 | End: 2025-03-21

## 2025-03-21 RX ORDER — LATANOPROST 50 UG/ML
0.005 % SOLUTION OPHTHALMIC
Qty: 15 | Refills: 3 | Status: ACTIVE
Start: 2025-03-21

## 2025-03-21 RX ORDER — DORZOLAMIDE HYDROCHLORIDE AND TIMOLOL MALEATE 20; 5 MG/ML; MG/ML
SOLUTION/ DROPS OPHTHALMIC
Qty: 1 | Refills: 5 | Status: INACTIVE
Start: 2025-03-21 | End: 2025-03-21

## 2025-03-21 RX ORDER — DIFLUPREDNATE OPHTHALMIC 0.5 MG/ML
0.05 % EMULSION OPHTHALMIC
Qty: 1 | Refills: 1 | Status: ACTIVE
Start: 2025-03-21

## 2025-03-21 RX ORDER — BRIMONIDINE TARTRATE 2 MG/ML
0.2 % SOLUTION/ DROPS OPHTHALMIC
Qty: 5 | Refills: 2 | Status: ACTIVE
Start: 2025-03-21

## 2025-03-21 ASSESSMENT — INTRAOCULAR PRESSURE
OD: 13
OS: 12

## 2025-04-03 ENCOUNTER — OFFICE VISIT (OUTPATIENT)
Dept: URBAN - METROPOLITAN AREA CLINIC 54 | Facility: CLINIC | Age: 45
End: 2025-04-03
Payer: COMMERCIAL

## 2025-04-03 DIAGNOSIS — Z94.7 CORNEAL TRANSPLANT STATUS: ICD-10-CM

## 2025-04-03 DIAGNOSIS — H40.1113 PRIMARY OPEN-ANGLE GLAUCOMA, RIGHT EYE, SEVERE STAGE: ICD-10-CM

## 2025-04-03 DIAGNOSIS — H59.811 CHORIORETINAL SCAR AFTER SURGERY FOR DETACHMENT OF RIGHT EYE: Primary | ICD-10-CM

## 2025-04-03 PROCEDURE — 92134 CPTRZ OPH DX IMG PST SGM RTA: CPT | Performed by: OPHTHALMOLOGY

## 2025-04-03 PROCEDURE — 99213 OFFICE O/P EST LOW 20 MIN: CPT | Performed by: OPHTHALMOLOGY

## 2025-04-03 ASSESSMENT — INTRAOCULAR PRESSURE
OS: 12
OD: 5

## 2025-05-15 ENCOUNTER — OFFICE VISIT (OUTPATIENT)
Dept: URBAN - METROPOLITAN AREA CLINIC 54 | Facility: CLINIC | Age: 45
End: 2025-05-15
Payer: COMMERCIAL

## 2025-05-15 DIAGNOSIS — H59.811 CHORIORETINAL SCAR AFTER SURGERY FOR DETACHMENT OF RIGHT EYE: Primary | ICD-10-CM

## 2025-05-15 DIAGNOSIS — H40.1113 PRIMARY OPEN-ANGLE GLAUCOMA, RIGHT EYE, SEVERE STAGE: ICD-10-CM

## 2025-05-15 DIAGNOSIS — Z94.7 CORNEAL TRANSPLANT STATUS: ICD-10-CM

## 2025-05-15 DIAGNOSIS — H43.11 VITREOUS HEMORRHAGE, RIGHT EYE: ICD-10-CM

## 2025-05-15 PROCEDURE — 99214 OFFICE O/P EST MOD 30 MIN: CPT | Performed by: OPHTHALMOLOGY

## 2025-05-15 PROCEDURE — 92134 CPTRZ OPH DX IMG PST SGM RTA: CPT | Performed by: OPHTHALMOLOGY

## 2025-05-15 ASSESSMENT — INTRAOCULAR PRESSURE: OS: 11

## 2025-06-05 ENCOUNTER — OFFICE VISIT (OUTPATIENT)
Dept: URBAN - METROPOLITAN AREA CLINIC 54 | Facility: CLINIC | Age: 45
End: 2025-06-05
Payer: COMMERCIAL

## 2025-06-05 DIAGNOSIS — H59.811 CHORIORETINAL SCAR AFTER SURGERY FOR DETACHMENT OF RIGHT EYE: Primary | ICD-10-CM

## 2025-06-05 DIAGNOSIS — Z94.7 CORNEAL TRANSPLANT STATUS: ICD-10-CM

## 2025-06-05 DIAGNOSIS — H40.1113 PRIMARY OPEN-ANGLE GLAUCOMA, RIGHT EYE, SEVERE STAGE: ICD-10-CM

## 2025-06-05 DIAGNOSIS — H43.11 VITREOUS HEMORRHAGE, RIGHT EYE: ICD-10-CM

## 2025-06-05 PROCEDURE — 99213 OFFICE O/P EST LOW 20 MIN: CPT | Performed by: OPHTHALMOLOGY

## 2025-06-05 PROCEDURE — 92134 CPTRZ OPH DX IMG PST SGM RTA: CPT | Performed by: OPHTHALMOLOGY

## 2025-06-05 RX ORDER — PREDNISOLONE ACETATE 10 MG/ML
1 % SUSPENSION/ DROPS OPHTHALMIC
Qty: 5 | Refills: 3 | Status: ACTIVE
Start: 2025-06-05

## 2025-06-05 ASSESSMENT — INTRAOCULAR PRESSURE: OS: 11

## 2025-06-26 ENCOUNTER — OFFICE VISIT (OUTPATIENT)
Dept: URBAN - METROPOLITAN AREA CLINIC 54 | Facility: CLINIC | Age: 45
End: 2025-06-26
Payer: COMMERCIAL

## 2025-06-26 DIAGNOSIS — Z94.7 CORNEAL TRANSPLANT STATUS: ICD-10-CM

## 2025-06-26 DIAGNOSIS — H59.811 CHORIORETINAL SCAR AFTER SURGERY FOR DETACHMENT OF RIGHT EYE: Primary | ICD-10-CM

## 2025-06-26 DIAGNOSIS — H43.11 VITREOUS HEMORRHAGE, RIGHT EYE: ICD-10-CM

## 2025-06-26 DIAGNOSIS — H40.1113 PRIMARY OPEN-ANGLE GLAUCOMA, RIGHT EYE, SEVERE STAGE: ICD-10-CM

## 2025-06-26 PROCEDURE — 99213 OFFICE O/P EST LOW 20 MIN: CPT | Performed by: OPHTHALMOLOGY

## 2025-06-26 ASSESSMENT — INTRAOCULAR PRESSURE
OS: 12
OD: 9

## 2025-07-02 ENCOUNTER — POST-OPERATIVE VISIT (OUTPATIENT)
Dept: URBAN - METROPOLITAN AREA CLINIC 54 | Facility: CLINIC | Age: 45
End: 2025-07-02
Payer: COMMERCIAL

## 2025-07-02 DIAGNOSIS — H59.811 CHORIORETINAL SCAR AFTER SURGERY FOR DETACHMENT OF RIGHT EYE: Primary | ICD-10-CM

## 2025-07-02 PROCEDURE — 99024 POSTOP FOLLOW-UP VISIT: CPT | Performed by: OPHTHALMOLOGY

## 2025-07-02 ASSESSMENT — INTRAOCULAR PRESSURE: OS: 12

## 2025-07-03 ENCOUNTER — POST-OPERATIVE VISIT (OUTPATIENT)
Dept: URBAN - METROPOLITAN AREA CLINIC 54 | Facility: CLINIC | Age: 45
End: 2025-07-03
Payer: COMMERCIAL

## 2025-07-03 DIAGNOSIS — H43.11 VITREOUS HEMORRHAGE, RIGHT EYE: Primary | ICD-10-CM

## 2025-07-03 PROCEDURE — 99024 POSTOP FOLLOW-UP VISIT: CPT | Performed by: OPHTHALMOLOGY

## 2025-07-03 ASSESSMENT — INTRAOCULAR PRESSURE
OD: 17
OS: 4

## 2025-07-10 ENCOUNTER — POST-OPERATIVE VISIT (OUTPATIENT)
Dept: URBAN - METROPOLITAN AREA CLINIC 54 | Facility: CLINIC | Age: 45
End: 2025-07-10
Payer: COMMERCIAL

## 2025-07-10 DIAGNOSIS — H43.11 VITREOUS HEMORRHAGE, RIGHT EYE: Primary | ICD-10-CM

## 2025-07-10 PROCEDURE — 99024 POSTOP FOLLOW-UP VISIT: CPT | Performed by: OPHTHALMOLOGY

## 2025-07-10 ASSESSMENT — INTRAOCULAR PRESSURE
OS: 9
OD: 5

## 2025-07-30 ENCOUNTER — POST-OPERATIVE VISIT (OUTPATIENT)
Dept: URBAN - METROPOLITAN AREA CLINIC 13 | Facility: CLINIC | Age: 45
End: 2025-07-30
Payer: COMMERCIAL

## 2025-07-30 DIAGNOSIS — H43.11 VITREOUS HEMORRHAGE, RIGHT EYE: ICD-10-CM

## 2025-07-30 DIAGNOSIS — H33.001 UNSPECIFIED RETINAL DETACHMENT WITH RETINAL BREAK, RIGHT EYE: Primary | ICD-10-CM

## 2025-07-30 PROCEDURE — 99024 POSTOP FOLLOW-UP VISIT: CPT | Performed by: OPHTHALMOLOGY

## 2025-07-30 ASSESSMENT — INTRAOCULAR PRESSURE
OS: 14
OD: 9